# Patient Record
Sex: FEMALE | Employment: STUDENT | URBAN - METROPOLITAN AREA
[De-identification: names, ages, dates, MRNs, and addresses within clinical notes are randomized per-mention and may not be internally consistent; named-entity substitution may affect disease eponyms.]

---

## 2020-03-01 ENCOUNTER — NURSE TRIAGE (OUTPATIENT)
Dept: NURSING | Facility: CLINIC | Age: 20
End: 2020-03-01

## 2020-03-01 NOTE — TELEPHONE ENCOUNTER
Ana Paula went to [place] on Friday for ear symptoms and tried hydrogen peroxide treatment and now cannot hear out of right ear.   Ana Paula was told to remove ear wax.  Ana Paula feels it was her trying to remove ear wax that made it plug.  Ana Paula does not want to try to remove any more wax at this time.  Denies fever and states that pain is very low currently.      Reason for Disposition    [1] Hearing loss in one or both ears AND [2] sudden onset AND [3] present now    Additional Information    Negative: [1] Ringing in the ears (tinnitus) AND [2] taking aspirin AND [3] dosage sounds high (i.e., > 1500 mg/day)    Negative: Patient sounds very sick or weak to the triager    Protocols used: HEARING LOSS OR CHANGE-A-AH

## 2020-10-01 ENCOUNTER — NURSE TRIAGE (OUTPATIENT)
Dept: NURSING | Facility: CLINIC | Age: 20
End: 2020-10-01

## 2020-10-01 NOTE — TELEPHONE ENCOUNTER
States direct exposure to COVID positive individual, currently asymptomatic.    Advised [facility].    Nicki Cornelius RN  [facility]    Reason for Disposition    [1] COVID-19 EXPOSURE (Close Contact) within last 14 days AND [2] needs COVID-19 lab test to return to work AND [3] NO symptoms    Protocols used: CORONAVIRUS (COVID-19) EXPOSURE-A- 8.4.20

## 2020-10-25 ENCOUNTER — NURSE TRIAGE (OUTPATIENT)
Dept: NURSING | Facility: CLINIC | Age: 20
End: 2020-10-25

## 2020-10-25 NOTE — TELEPHONE ENCOUNTER
Thinks has vaginal yeast infection   Itchy, red, swollen with no out of the ordinary discharge. No unusual odor.  Has not used antibiotics recently.  Does not have diabetes.  Wears tights.    Will try OTC medication for vaginal yeast infection.  Will stop if symptoms worsen.      Inside corner of eye's eyelids swollen and dry for about two weeks.  Two of her roommates had same thing.  Several of them recently had covid 19. One of them with similar eye symptoms did not have covid 19. Roommate's eye symptoms have resolved.  Vision fine  Afebrile  No cold symptoms  Vaseline has helped  Understands she can have visit with [facility] provider. Will continue using vaseline or other moisturizer.     COVID 19 Nurse Triage Plan/Patient Instructions    Please be aware that novel coronavirus (COVID-19) may be circulating in the community. If you develop symptoms such as fever, cough, or SOB or if you have concerns about the presence of another infection including coronavirus (COVID-19), please contact your health care provider or visit [email or web address].     Disposition/Instructions    Home care recommended. Follow home care protocol based instructions.    Thank you for taking steps to prevent the spread of this virus.  o Limit your contact with others.  o Wear a simple mask to cover your cough.  o Wash your hands well and often.    Resources    [facility]: About COVID-19: [email or web address]    CDC: What to Do If You're Sick: www.cdc.gov/coronavirus/2019-ncov/about/steps-when-sick.html    CDC: Ending Home Isolation: www.cdc.gov/coronavirus/2019-ncov/hcp/disposition-in-home-patients.html     CDC: Caring for Someone: www.cdc.gov/coronavirus/2019-ncov/if-you-are-sick/care-for-someone.html     [facility]: Interim Guidance for Hospital Discharge to Home: www.health.[email or web address].[place]/diseases/coronavirus/hcp/hospdischarge.pdf    [facility] clinical trials (COVID-19 research studies):  "clinicalaffairs.[facility]-clinical-trials     Below are the COVID-19 hotlines at the [place] [facility] ([facility]). Interpreters are available.   o For health questions: Call 693-581-0407 or 1-486.460.3998 (7 a.m. to 7 p.m.)  o For questions about schools and childcare: Call 827-577-2980 or 1-898.149.2549 (7 a.m. to 7 p.m.)     Additional Information    Negative: Patient sounds very sick or weak to the triager    Negative: [1] SEVERE pain AND [2] not improved 2 hours after pain medicine    Negative: [1] Genital area looks infected (e.g., draining sore, spreading redness) AND [2] fever    Negative: [1] Something is hanging out of the vagina AND [2] can't easily be pushed back inside    Negative: MODERATE-SEVERE itching (i.e., interferes with school, work, or sleep)    Negative: Genital area looks infected (e.g., draining sore, spreading redness)    Negative: Rash with painful tiny water blisters    Negative: [1] Rash (e.g., redness, tiny bumps, sore) of genital area AND [2] present > 24 hours    Negative: Tender lump (swelling or \"ball\") at vaginal opening    Negative: [1] Symptoms of a yeast infection (i.e., itchy, white discharge, not bad smelling) AND    [2] not improved > 3 days following CARE ADVICE    Negative: [1] Vaginal itching AND [2] not improved > 3 days following CARE ADVICE    Negative: Patient is worried about sexually transmitted disease (STD)    Negative: Feels like something inside is falling out of vagina (e.g., pressure, heaviness, fullness)    Negative: [1] Vaginal dryness or itching AND [2] nearing menopause or after menopause    Negative: Pain with sexual intercourse (dyspareunia)  (Exception: feels like prior yeast infection, minor abrasion, minor rash < 24 hour duration, mild itching)    Negative: Pain in genital area is a chronic symptom (recurrent or ongoing AND present > 4 weeks)    Negative: All other vaginal symptoms  (Exception: feels like prior yeast infection, minor abrasion, " mild rash < 24 hour duration, mild itching)    [1] Symptoms of a yeast infection (i.e., itchy, white discharge, not bad smelling) AND    [2] feels like prior vaginal yeast infections    Protocols used: VAGINAL SYMPTOMS-A-JUNG DICKSON RN [facility]

## 2025-01-10 PROBLEM — R14.0 BLOATING SYMPTOM: Status: ACTIVE | Noted: 2025-01-10

## 2025-01-22 ENCOUNTER — TRANSFERRED RECORDS (OUTPATIENT)
Dept: HEALTH INFORMATION MANAGEMENT | Facility: CLINIC | Age: 25
End: 2025-01-22
Payer: COMMERCIAL